# Patient Record
Sex: FEMALE | Race: WHITE | Employment: OTHER | URBAN - METROPOLITAN AREA
[De-identification: names, ages, dates, MRNs, and addresses within clinical notes are randomized per-mention and may not be internally consistent; named-entity substitution may affect disease eponyms.]

---

## 2017-02-27 PROBLEM — E66.09 NON MORBID OBESITY DUE TO EXCESS CALORIES: Status: ACTIVE | Noted: 2017-02-27

## 2017-02-27 PROBLEM — E66.9 OBESITY (BMI 30-39.9): Status: ACTIVE | Noted: 2017-02-27

## 2017-05-15 PROBLEM — H25.813 COMBINED FORMS OF AGE-RELATED CATARACT OF BOTH EYES: Status: ACTIVE | Noted: 2017-05-15

## 2017-08-30 PROBLEM — E66.9 OBESITY (BMI 30-39.9): Status: RESOLVED | Noted: 2017-02-27 | Resolved: 2017-08-30

## 2017-09-18 PROBLEM — E66.09 NON MORBID OBESITY DUE TO EXCESS CALORIES: Status: RESOLVED | Noted: 2017-02-27 | Resolved: 2017-09-18

## 2018-03-02 PROBLEM — I77.1 TORTUOUS AORTA (HCC): Status: ACTIVE | Noted: 2018-03-02

## 2018-05-05 PROBLEM — M15.9 OSTEOARTHRITIS OF MULTIPLE JOINTS: Status: ACTIVE | Noted: 2018-05-05

## 2018-08-06 PROBLEM — R73.01 IMPAIRED FASTING GLUCOSE: Status: ACTIVE | Noted: 2018-08-06

## 2020-01-01 ENCOUNTER — VIRTUAL PHONE E/M (OUTPATIENT)
Dept: GENETICS | Facility: HOSPITAL | Age: 75
End: 2020-01-01
Attending: GENETIC COUNSELOR, MS
Payer: MEDICARE

## 2020-01-01 ENCOUNTER — GENETICS ENCOUNTER (OUTPATIENT)
Dept: HEMATOLOGY/ONCOLOGY | Facility: HOSPITAL | Age: 75
End: 2020-01-01

## 2020-01-01 PROCEDURE — 96040 HC GENETIC COUNSELING EA 30 MIN: CPT | Performed by: GENETIC COUNSELOR, MS

## 2020-04-22 PROBLEM — C25.9 PANCREATIC ADENOCARCINOMA (HCC): Status: ACTIVE | Noted: 2020-01-01

## 2020-05-13 NOTE — PROGRESS NOTES
Ms. Esther Wu verbally consents to a telephone consultation.        Referring Provider:  Mike Mitchell MD    Additional Provider(s):  MD Mariposa Rain MD    Reason for Referral:  Dio Boswell was referred for gene two sisters, none of whom had any cancers to Ms. Crawford’s knowledge. One of Ms. Crawford’s paternal cousins  at age [de-identified] from leukemia and another paternal cousin  at age 80 from an unknown cancer.    Please see the pedigree for additional family his still possible that Ms. Kalli Snyder has a pathogenic variant in one of these genes that was not detected by the genetic test, or that the family is dealing with a hereditary cancer syndrome involving a different gene. It is also possible that Ms. Crawford’s relat a personal and family history of pancreatic cancer. Her reported family history is suspicious for a hereditary cancer syndrome. Genetic testing on Ms. Crawford for BRCA1/2 pathogenic variants as part of a multigene panel is indicated.       At the conclusio

## 2020-06-10 PROBLEM — Z93.9 ARTIFICIAL OPENING STATUS: Status: ACTIVE | Noted: 2020-01-01

## 2020-06-11 PROBLEM — Z00.00 PREVENTATIVE HEALTH CARE: Status: ACTIVE | Noted: 2020-01-01

## 2020-06-30 NOTE — PROGRESS NOTES
Referring Provider:                    Jackelyn Bae MD     Additional Provider(s):              MD Dennis Dale MD    Reason for Referral:  Oumou mejia their personal and family history.     Cc:  Austen Noe

## 2020-08-20 PROBLEM — E87.6 HYPOKALEMIA: Status: ACTIVE | Noted: 2020-01-01
